# Patient Record
Sex: FEMALE | Race: OTHER | HISPANIC OR LATINO | Employment: UNEMPLOYED | URBAN - METROPOLITAN AREA
[De-identification: names, ages, dates, MRNs, and addresses within clinical notes are randomized per-mention and may not be internally consistent; named-entity substitution may affect disease eponyms.]

---

## 2024-08-20 ENCOUNTER — HOSPITAL ENCOUNTER (EMERGENCY)
Facility: HOSPITAL | Age: 29
Discharge: HOME | End: 2024-08-20
Attending: EMERGENCY MEDICINE
Payer: MEDICAID

## 2024-08-20 VITALS
BODY MASS INDEX: 23.57 KG/M2 | HEART RATE: 89 BPM | HEIGHT: 66 IN | DIASTOLIC BLOOD PRESSURE: 70 MMHG | OXYGEN SATURATION: 100 % | TEMPERATURE: 98 F | RESPIRATION RATE: 16 BRPM | WEIGHT: 146.63 LBS | SYSTOLIC BLOOD PRESSURE: 126 MMHG

## 2024-08-20 DIAGNOSIS — R10.30 LOWER ABDOMINAL PAIN: Primary | ICD-10-CM

## 2024-08-20 LAB
ALBUMIN SERPL BCP-MCNC: 4.3 G/DL (ref 3.5–5.2)
ALP SERPL-CCNC: 72 U/L (ref 55–135)
ALT SERPL W/O P-5'-P-CCNC: 10 U/L (ref 10–44)
ANION GAP SERPL CALC-SCNC: 12 MMOL/L (ref 8–16)
AST SERPL-CCNC: 17 U/L (ref 10–40)
B-HCG UR QL: NEGATIVE
BASOPHILS # BLD AUTO: 0.05 K/UL (ref 0–0.2)
BASOPHILS NFR BLD: 0.4 % (ref 0–1.9)
BILIRUB SERPL-MCNC: 0.4 MG/DL (ref 0.1–1)
BILIRUB UR QL STRIP: NEGATIVE
BUN SERPL-MCNC: 5 MG/DL (ref 6–20)
CALCIUM SERPL-MCNC: 9.7 MG/DL (ref 8.7–10.5)
CHLORIDE SERPL-SCNC: 104 MMOL/L (ref 95–110)
CLARITY UR: CLEAR
CO2 SERPL-SCNC: 24 MMOL/L (ref 23–29)
COLOR UR: COLORLESS
CREAT SERPL-MCNC: 0.8 MG/DL (ref 0.5–1.4)
DIFFERENTIAL METHOD BLD: NORMAL
EOSINOPHIL # BLD AUTO: 0.1 K/UL (ref 0–0.5)
EOSINOPHIL NFR BLD: 0.4 % (ref 0–8)
ERYTHROCYTE [DISTWIDTH] IN BLOOD BY AUTOMATED COUNT: 13.3 % (ref 11.5–14.5)
EST. GFR  (NO RACE VARIABLE): >60 ML/MIN/1.73 M^2
GLUCOSE SERPL-MCNC: 82 MG/DL (ref 70–110)
GLUCOSE UR QL STRIP: NEGATIVE
HCT VFR BLD AUTO: 43.6 % (ref 37–48.5)
HCV AB SERPL QL IA: NEGATIVE
HEP C VIRUS HOLD SPECIMEN: NORMAL
HGB BLD-MCNC: 14.4 G/DL (ref 12–16)
HGB UR QL STRIP: ABNORMAL
HIV 1+2 AB+HIV1 P24 AG SERPL QL IA: NEGATIVE
IMM GRANULOCYTES # BLD AUTO: 0.03 K/UL (ref 0–0.04)
IMM GRANULOCYTES NFR BLD AUTO: 0.3 % (ref 0–0.5)
KETONES UR QL STRIP: NEGATIVE
LEUKOCYTE ESTERASE UR QL STRIP: NEGATIVE
LYMPHOCYTES # BLD AUTO: 4 K/UL (ref 1–4.8)
LYMPHOCYTES NFR BLD: 33.8 % (ref 18–48)
MCH RBC QN AUTO: 29.7 PG (ref 27–31)
MCHC RBC AUTO-ENTMCNC: 33 G/DL (ref 32–36)
MCV RBC AUTO: 90 FL (ref 82–98)
MICROSCOPIC COMMENT: NORMAL
MONOCYTES # BLD AUTO: 0.8 K/UL (ref 0.3–1)
MONOCYTES NFR BLD: 7.1 % (ref 4–15)
NEUTROPHILS # BLD AUTO: 6.8 K/UL (ref 1.8–7.7)
NEUTROPHILS NFR BLD: 58 % (ref 38–73)
NITRITE UR QL STRIP: NEGATIVE
NRBC BLD-RTO: 0 /100 WBC
PH UR STRIP: 7 [PH] (ref 5–8)
PLATELET # BLD AUTO: 313 K/UL (ref 150–450)
PMV BLD AUTO: 9.3 FL (ref 9.2–12.9)
POTASSIUM SERPL-SCNC: 3.5 MMOL/L (ref 3.5–5.1)
PROT SERPL-MCNC: 7.9 G/DL (ref 6–8.4)
PROT UR QL STRIP: NEGATIVE
RBC # BLD AUTO: 4.85 M/UL (ref 4–5.4)
RBC #/AREA URNS HPF: 3 /HPF (ref 0–4)
SODIUM SERPL-SCNC: 140 MMOL/L (ref 136–145)
SP GR UR STRIP: <1.005 (ref 1–1.03)
URN SPEC COLLECT METH UR: ABNORMAL
UROBILINOGEN UR STRIP-ACNC: NEGATIVE EU/DL
WBC # BLD AUTO: 11.68 K/UL (ref 3.9–12.7)

## 2024-08-20 PROCEDURE — 25000003 PHARM REV CODE 250: Performed by: REGISTERED NURSE

## 2024-08-20 PROCEDURE — 96374 THER/PROPH/DIAG INJ IV PUSH: CPT

## 2024-08-20 PROCEDURE — 86803 HEPATITIS C AB TEST: CPT | Performed by: REGISTERED NURSE

## 2024-08-20 PROCEDURE — 87389 HIV-1 AG W/HIV-1&-2 AB AG IA: CPT | Performed by: REGISTERED NURSE

## 2024-08-20 PROCEDURE — 81000 URINALYSIS NONAUTO W/SCOPE: CPT | Performed by: NURSE PRACTITIONER

## 2024-08-20 PROCEDURE — 96361 HYDRATE IV INFUSION ADD-ON: CPT

## 2024-08-20 PROCEDURE — 99285 EMERGENCY DEPT VISIT HI MDM: CPT | Mod: 25

## 2024-08-20 PROCEDURE — 85025 COMPLETE CBC W/AUTO DIFF WBC: CPT | Performed by: REGISTERED NURSE

## 2024-08-20 PROCEDURE — 81025 URINE PREGNANCY TEST: CPT | Performed by: REGISTERED NURSE

## 2024-08-20 PROCEDURE — 63600175 PHARM REV CODE 636 W HCPCS: Performed by: REGISTERED NURSE

## 2024-08-20 PROCEDURE — 80053 COMPREHEN METABOLIC PANEL: CPT | Performed by: REGISTERED NURSE

## 2024-08-20 RX ORDER — KETOROLAC TROMETHAMINE 30 MG/ML
15 INJECTION, SOLUTION INTRAMUSCULAR; INTRAVENOUS
Status: COMPLETED | OUTPATIENT
Start: 2024-08-20 | End: 2024-08-20

## 2024-08-20 RX ORDER — HYDROCODONE BITARTRATE AND ACETAMINOPHEN 5; 325 MG/1; MG/1
1 TABLET ORAL EVERY 6 HOURS PRN
Qty: 12 TABLET | Refills: 0 | Status: SHIPPED | OUTPATIENT
Start: 2024-08-20

## 2024-08-20 RX ADMIN — KETOROLAC TROMETHAMINE 15 MG: 30 INJECTION, SOLUTION INTRAMUSCULAR at 05:08

## 2024-08-20 RX ADMIN — SODIUM CHLORIDE 1000 ML: 9 INJECTION, SOLUTION INTRAVENOUS at 06:08

## 2024-08-20 NOTE — ED PROVIDER NOTES
Encounter Date: 8/20/2024       History     Chief Complaint   Patient presents with    Dizziness     Pt c/o dizziness that began yesterday.  The patient also c/o low abdominal pain that radiates to her left leg and makes it difficult to walk.     29-year-old female presents emergency department with complaints of dizziness and lower abdominal pain.  Patient states dizziness and pain or intermittent.  Pain is worsened with walking.  Patient states dizziness is worse with standing.  Patient states symptoms began several days ago.  Patient denies any fever, chills, chest pain, shortness of breath, diarrhea, vomiting or any other symptoms.    The history is provided by the patient.     Review of patient's allergies indicates:  No Known Allergies  History reviewed. No pertinent past medical history.  History reviewed. No pertinent surgical history.  No family history on file.  Social History     Tobacco Use    Smoking status: Never    Smokeless tobacco: Never   Substance Use Topics    Drug use: Never     Review of Systems   Constitutional:  Negative for fever.   HENT:  Negative for sore throat.    Respiratory:  Negative for shortness of breath.    Cardiovascular:  Negative for chest pain.   Gastrointestinal:  Positive for abdominal pain. Negative for nausea.   Genitourinary:  Negative for dysuria.   Musculoskeletal:  Negative for back pain.   Skin:  Negative for rash.   Neurological:  Positive for dizziness. Negative for weakness.   Hematological:  Does not bruise/bleed easily.   All other systems reviewed and are negative.      Physical Exam     Initial Vitals [08/20/24 1711]   BP Pulse Resp Temp SpO2   126/70 89 16 98 °F (36.7 °C) 100 %      MAP       --         Physical Exam    Constitutional: She appears well-developed and well-nourished. She is not diaphoretic. No distress.   HENT:   Head: Normocephalic and atraumatic.   Eyes: Conjunctivae and EOM are normal. Pupils are equal, round, and reactive to light.   Neck:  Neck supple.   Normal range of motion.  Cardiovascular:  Normal rate, regular rhythm and normal heart sounds.           No murmur heard.  Pulmonary/Chest: Breath sounds normal. No respiratory distress. She has no wheezes. She has no rales.   Abdominal: Abdomen is soft. Bowel sounds are normal. There is no abdominal tenderness. There is no rebound and no guarding.   Musculoskeletal:         General: No tenderness or edema. Normal range of motion.      Cervical back: Normal range of motion and neck supple.     Neurological: She is alert and oriented to person, place, and time. No cranial nerve deficit. GCS score is 15. GCS eye subscore is 4. GCS verbal subscore is 5. GCS motor subscore is 6.   Skin: Skin is warm and dry. Capillary refill takes less than 2 seconds.   Psychiatric: She has a normal mood and affect. Thought content normal.         ED Course   Procedures  Labs Reviewed   URINALYSIS, REFLEX TO URINE CULTURE - Abnormal       Result Value    Specimen UA Urine, Clean Catch      Color, UA Colorless (*)     Appearance, UA Clear      pH, UA 7.0      Specific Gravity, UA <1.005 (*)     Protein, UA Negative      Glucose, UA Negative      Ketones, UA Negative      Bilirubin (UA) Negative      Occult Blood UA 1+ (*)     Nitrite, UA Negative      Urobilinogen, UA Negative      Leukocytes, UA Negative      Narrative:     Specimen Source->Urine   COMPREHENSIVE METABOLIC PANEL - Abnormal    Sodium 140      Potassium 3.5      Chloride 104      CO2 24      Glucose 82      BUN 5 (*)     Creatinine 0.8      Calcium 9.7      Total Protein 7.9      Albumin 4.3      Total Bilirubin 0.4      Alkaline Phosphatase 72      AST 17      ALT 10      eGFR >60      Anion Gap 12     CBC W/ AUTO DIFFERENTIAL    WBC 11.68      RBC 4.85      Hemoglobin 14.4      Hematocrit 43.6      MCV 90      MCH 29.7      MCHC 33.0      RDW 13.3      Platelets 313      MPV 9.3      Immature Granulocytes 0.3      Gran # (ANC) 6.8      Immature Grans (Abs)  0.03      Lymph # 4.0      Mono # 0.8      Eos # 0.1      Baso # 0.05      nRBC 0      Gran % 58.0      Lymph % 33.8      Mono % 7.1      Eosinophil % 0.4      Basophil % 0.4      Differential Method Automated     PREGNANCY TEST, URINE RAPID    Preg Test, Ur Negative      Narrative:     Specimen Source->Urine   HIV 1 / 2 ANTIBODY    HIV 1/2 Ag/Ab Negative      Narrative:     Release to patient->Immediate   HEPATITIS C ANTIBODY    Hepatitis C Ab Negative      Narrative:     Release to patient->Immediate   HEP C VIRUS HOLD SPECIMEN    HEP C Virus Hold Specimen Hold for HCV sendout      Narrative:     Release to patient->Immediate   URINALYSIS MICROSCOPIC    RBC, UA 3      Microscopic Comment SEE COMMENT      Narrative:     Specimen Source->Urine   POCT URINE PREGNANCY          Imaging Results              US Pelvis Complete Non OB (Final result)  Result time 08/20/24 20:45:14      Final result by Lisa Stein MD (08/20/24 20:45:14)                   Impression:      No pathology identified      Electronically signed by: Lisa Stein  Date:    08/20/2024  Time:    20:45               Narrative:    EXAMINATION:  US PELVIS COMPLETE NON OB    CLINICAL HISTORY:  pelvic pain;    TECHNIQUE:  Transabdominal pelvic sonogram    COMPARISON:  None available    FINDINGS:  Uterus normal size anteverted length 9 cm.  Endometrial stripe thickness 9 mm.  Ovaries and adnexa unremarkable.                                       Medications   ketorolac injection 15 mg (15 mg Intravenous Given 8/20/24 1759)   sodium chloride 0.9% bolus 1,000 mL 1,000 mL (0 mLs Intravenous Stopped 8/20/24 1902)     Medical Decision Making  Amount and/or Complexity of Data Reviewed  Labs: ordered.  Radiology: ordered.    Risk  Prescription drug management.                     [address] [facility], accept hand off at this time     10:24 PM  I discussed with patient that the evaluation in the emergency department does not suggest any  emergent or life threatening medical condition requiring immediate intervention beyond what was provided in the ED, and I believe patient is safe for discharge.  Regardless, an unremarkable evaluation in the ED does not preclude the development or presence of a serious of life threatening condition. As such, patient was instructed to return immediately for any worsening or change in current symptoms. I also discussed the results of my evaluation and diagnosis with patient and she concurs with the evaluation and management plan.  Detailed written and verbal instructions provided to patient and she expressed a verbal understanding of the discharge instructions and overall management plan. Reiterated the importance of medication administration and safety and advised patient to follow up with primary care provider in 3-5 days or sooner if needed.  Also advised patient to return to the ER for any complications.                    Clinical Impression:  Final diagnoses:  [R10.30] Lower abdominal pain (Primary)          ED Disposition Condition    Discharge Stable          ED Prescriptions       Medication Sig Dispense Start Date End Date Auth. Provider    HYDROcodone-acetaminophen (NORCO) 5-325 mg per tablet Take 1 tablet by mouth every 6 (six) hours as needed for Pain. 12 tablet 8/20/2024 -- Elvis Anaya NP          Follow-up Information       Follow up With Specialties Details Why Contact Info    Cristela Giang MD Obstetrics and Gynecology  As needed [address] [facility] [address] [phone]      [facility] - Emergency Dept. Emergency Medicine  If symptoms worsen [id number] [facility] [id number] [phone]             Elvis Anaya NP  08/20/24 8422

## 2024-08-20 NOTE — FIRST PROVIDER EVALUATION
Emergency Department TeleTriage Encounter Note      CHIEF COMPLAINT    Chief Complaint   Patient presents with    Dizziness     Pt c/o dizziness that began yesterday.  The patient also c/o low abdominal pain that radiates to her left leg and makes it difficult to walk.       VITAL SIGNS   Initial Vitals [08/20/24 1711]   BP Pulse Resp Temp SpO2   126/70 89 16 98 °F (36.7 °C) 100 %      MAP       --            ALLERGIES    Review of patient's allergies indicates:  No Known Allergies    PROVIDER TRIAGE NOTE  TeleTriage Note: Tita Edwards, a nontoxic/well appearing, 29 y.o. female, presented to the ED with c/o attempted to see patient and she does not speak English. She does not appear in distress.     All ED beds are full at present; patient notified of this status.  Patient seen and medically screened by Nurse Practitioner via teletriage. Orders initiated at triage to expedite care.  Patient is stable to return to the waiting room and will be placed in an ED bed when available.  Care will be transferred to an alternate provider when patient has been placed in an Exam Room from the [facility] for physical exam, additional orders, and disposition.  5:39 PM Triny Valentino DNP, FNP-C        ORDERS  Labs Reviewed - No data to display    ED Orders (720h ago, onward)      None              Virtual Visit Note: The provider triage portion of this emergency department evaluation and documentation was performed via [employer], a HIPAA-compliant telemedicine application, in concert with a tele-presenter in the room. A face to face patient evaluation with one of my colleagues will occur once the patient is placed in an emergency department room.      DISCLAIMER: This note was prepared with [employer] voice recognition transcription software. Garbled syntax, mangled pronouns, and other bizarre constructions may be attributed to that software system.    
Ventral Hernia Repair with mesh/yes

## 2024-08-20 NOTE — Clinical Note
"Tita Linareskyung Edwards was seen and treated in our emergency department on 8/20/2024.  She may return to work on 08/22/2024.       If you have any questions or concerns, please don't hesitate to call.      Cira Clarke LPN    "